# Patient Record
Sex: MALE | Race: WHITE | NOT HISPANIC OR LATINO | URBAN - METROPOLITAN AREA
[De-identification: names, ages, dates, MRNs, and addresses within clinical notes are randomized per-mention and may not be internally consistent; named-entity substitution may affect disease eponyms.]

---

## 2023-08-02 ENCOUNTER — INPATIENT HOSPITAL (INPATIENT)
Dept: URBAN - METROPOLITAN AREA HOSPITAL 124 | Facility: HOSPITAL | Age: 68
End: 2023-08-02

## 2023-08-02 DIAGNOSIS — K29.80 DUODENITIS WITHOUT BLEEDING: ICD-10-CM

## 2023-08-02 DIAGNOSIS — R10.84 GENERALIZED ABDOMINAL PAIN: ICD-10-CM

## 2023-08-02 DIAGNOSIS — K52.9 NONINFECTIVE GASTROENTERITIS AND COLITIS, UNSPECIFIED: ICD-10-CM

## 2023-08-02 DIAGNOSIS — R93.5 ABNORMAL FINDINGS ON DIAGNOSTIC IMAGING OF OTHER ABDOMINAL R: ICD-10-CM

## 2023-08-02 DIAGNOSIS — K31.A15 GASTRIC INTESTINAL METAPLASIA WITHOUT DYSPLASIA, INVOLVING M: ICD-10-CM

## 2023-08-02 DIAGNOSIS — K44.9 DIAPHRAGMATIC HERNIA WITHOUT OBSTRUCTION OR GANGRENE: ICD-10-CM

## 2023-08-02 DIAGNOSIS — K31.89 OTHER DISEASES OF STOMACH AND DUODENUM: ICD-10-CM

## 2023-08-02 PROCEDURE — 43229 ESOPHAGOSCOPY LESION ABLATE: CPT | Performed by: INTERNAL MEDICINE

## 2023-08-02 PROCEDURE — 99222 1ST HOSP IP/OBS MODERATE 55: CPT | Mod: 25,SA | Performed by: NURSE PRACTITIONER

## 2023-10-04 ENCOUNTER — OFFICE (INPATIENT)
Dept: URBAN - METROPOLITAN AREA CLINIC 11 | Facility: CLINIC | Age: 68
End: 2023-10-04

## 2023-10-04 VITALS
WEIGHT: 186 LBS | DIASTOLIC BLOOD PRESSURE: 62 MMHG | OXYGEN SATURATION: 99 % | HEART RATE: 68 BPM | HEIGHT: 75 IN | SYSTOLIC BLOOD PRESSURE: 110 MMHG

## 2023-10-04 DIAGNOSIS — K25.9 GASTRIC ULCER, UNSPECIFIED AS ACUTE OR CHRONIC, WITHOUT HEMO: ICD-10-CM

## 2023-10-04 DIAGNOSIS — R14.0 ABDOMINAL DISTENSION (GASEOUS): ICD-10-CM

## 2023-10-04 DIAGNOSIS — K29.80 DUODENITIS WITHOUT BLEEDING: ICD-10-CM

## 2023-10-04 PROCEDURE — 99214 OFFICE O/P EST MOD 30 MIN: CPT | Performed by: NURSE PRACTITIONER

## 2023-10-04 RX ORDER — PANTOPRAZOLE 40 MG/1
80 TABLET, DELAYED RELEASE ORAL
Qty: 60 | Refills: 4 | Status: COMPLETED
End: 2024-06-17

## 2023-10-04 NOTE — SERVICENOTES
overall doing very well on pantoprazole b.i.d..  Very slight bloating occasionally but nothing concerning.   Will discuss with Dr. Colvin about repeating EGD after he has been on PPI for few months to see if his inflammation has improved.  will continue PPI and see back in 3 months or sooner if needed

## 2023-10-04 NOTE — SERVICEHPINOTES
Mr. Mcmullen is a 68 year old male here today for hospital follow up. He was seen by Dr. Colvin at [facility] on 08/02/2023 for abdominal pain and CT findings concerning for enteritis with duodenal diverticula concern for duodenitis. He had EGD on 08/02/2023 that showed ulcers in the antrum and distal stomach body congestion, erythema and ulceration in the duodenum to the proximal jejunum.  Biopsies showed inflammation that could potentially be infectious ischemic related.   He was started on pantoprazole 40 mg BID.  In follow-up today,  he has slight bloating occasionally but nothing concerning.  Overall, he feels significantly better and has no major complaints or concerns. He continues on pantoprazole 40 mg BID.  he has regular bowel movements and denies any change in bowel habits or blood in stool.  He denies any nausea, vomiting, heartburn, reflux, abdominal pain.  He had a colonoscopy within the last year and a half at [facility] and states he had polyps removed.

## 2024-01-04 ENCOUNTER — OFFICE (INPATIENT)
Dept: URBAN - METROPOLITAN AREA CLINIC 11 | Facility: CLINIC | Age: 69
End: 2024-01-04

## 2024-01-04 VITALS
SYSTOLIC BLOOD PRESSURE: 166 MMHG | DIASTOLIC BLOOD PRESSURE: 81 MMHG | OXYGEN SATURATION: 98 % | HEIGHT: 75 IN | WEIGHT: 197 LBS | HEART RATE: 76 BPM

## 2024-01-04 DIAGNOSIS — K29.80 DUODENITIS WITHOUT BLEEDING: ICD-10-CM

## 2024-01-04 DIAGNOSIS — R14.0 ABDOMINAL DISTENSION (GASEOUS): ICD-10-CM

## 2024-01-04 DIAGNOSIS — K25.9 GASTRIC ULCER, UNSPECIFIED AS ACUTE OR CHRONIC, WITHOUT HEMO: ICD-10-CM

## 2024-01-04 PROCEDURE — 99214 OFFICE O/P EST MOD 30 MIN: CPT | Performed by: NURSE PRACTITIONER

## 2024-01-04 RX ORDER — PANTOPRAZOLE 40 MG/1
80 TABLET, DELAYED RELEASE ORAL
Qty: 60 | Refills: 4 | Status: COMPLETED
End: 2024-06-17

## 2024-01-04 NOTE — SERVICEHPINOTES
Mr. Mcmullen is a 68 year old male here today for hospital follow up. He was seen by Dr. Colvin at [facility] on 08/02/2023 for abdominal pain and CT findings concerning for enteritis with duodenal diverticula concern for duodenitis. He had EGD on 08/02/2023 that showed ulcers in the antrum and distal stomach body congestion, erythema and ulceration in the duodenum to the proximal jejunum.  Biopsies showed inflammation that could potentially be infectious ischemic related.   He was started on pantoprazole 40 mg BID.  In follow-up today,  he has slight bloating occasionally but nothing concerning.  Overall, he feels well and has no major GI complaints or concerns. He continues on pantoprazole 40 mg BID.  He has regular bowel movements and denies any change in bowel habits or blood in stool.  He denies any nausea, vomiting, heartburn, reflux, abdominal pain.

## 2024-01-04 NOTE — SERVICENOTES
he is doing well on PPI BID. will continue and repeat EGD. will see back in 3 months or sooner if needed

## 2024-02-08 ENCOUNTER — AMBULATORY SURGICAL CENTER (INPATIENT)
Dept: URBAN - METROPOLITAN AREA SURGERY 3 | Facility: SURGERY | Age: 69
End: 2024-02-08
Payer: COMMERCIAL

## 2024-02-08 VITALS
HEART RATE: 55 BPM | HEART RATE: 60 BPM | HEART RATE: 61 BPM | OXYGEN SATURATION: 94 % | HEIGHT: 75 IN | RESPIRATION RATE: 16 BRPM | DIASTOLIC BLOOD PRESSURE: 58 MMHG | OXYGEN SATURATION: 95 % | OXYGEN SATURATION: 95 % | OXYGEN SATURATION: 99 % | WEIGHT: 186 LBS | RESPIRATION RATE: 16 BRPM | DIASTOLIC BLOOD PRESSURE: 56 MMHG | OXYGEN SATURATION: 94 % | HEIGHT: 75 IN | RESPIRATION RATE: 14 BRPM | RESPIRATION RATE: 15 BRPM | SYSTOLIC BLOOD PRESSURE: 106 MMHG | WEIGHT: 186 LBS | SYSTOLIC BLOOD PRESSURE: 106 MMHG | SYSTOLIC BLOOD PRESSURE: 98 MMHG | SYSTOLIC BLOOD PRESSURE: 106 MMHG | TEMPERATURE: 98.3 F | SYSTOLIC BLOOD PRESSURE: 147 MMHG | OXYGEN SATURATION: 96 % | HEART RATE: 55 BPM | DIASTOLIC BLOOD PRESSURE: 70 MMHG | TEMPERATURE: 98.2 F | RESPIRATION RATE: 18 BRPM | SYSTOLIC BLOOD PRESSURE: 147 MMHG | HEIGHT: 75 IN | HEART RATE: 79 BPM | HEART RATE: 60 BPM | OXYGEN SATURATION: 94 % | DIASTOLIC BLOOD PRESSURE: 75 MMHG | HEART RATE: 62 BPM | HEART RATE: 79 BPM | OXYGEN SATURATION: 95 % | DIASTOLIC BLOOD PRESSURE: 58 MMHG | RESPIRATION RATE: 14 BRPM | OXYGEN SATURATION: 96 % | HEART RATE: 61 BPM | RESPIRATION RATE: 16 BRPM | DIASTOLIC BLOOD PRESSURE: 75 MMHG | HEART RATE: 61 BPM | HEART RATE: 62 BPM | OXYGEN SATURATION: 99 % | WEIGHT: 186 LBS | TEMPERATURE: 98.3 F | HEART RATE: 62 BPM | HEART RATE: 60 BPM | SYSTOLIC BLOOD PRESSURE: 113 MMHG | DIASTOLIC BLOOD PRESSURE: 70 MMHG | RESPIRATION RATE: 18 BRPM | RESPIRATION RATE: 18 BRPM | DIASTOLIC BLOOD PRESSURE: 56 MMHG | SYSTOLIC BLOOD PRESSURE: 113 MMHG | DIASTOLIC BLOOD PRESSURE: 70 MMHG | OXYGEN SATURATION: 96 % | SYSTOLIC BLOOD PRESSURE: 113 MMHG | HEART RATE: 79 BPM | OXYGEN SATURATION: 99 % | RESPIRATION RATE: 14 BRPM | SYSTOLIC BLOOD PRESSURE: 147 MMHG | DIASTOLIC BLOOD PRESSURE: 75 MMHG | SYSTOLIC BLOOD PRESSURE: 98 MMHG | DIASTOLIC BLOOD PRESSURE: 56 MMHG | DIASTOLIC BLOOD PRESSURE: 58 MMHG | TEMPERATURE: 98.2 F | HEART RATE: 55 BPM | TEMPERATURE: 98.3 F | RESPIRATION RATE: 15 BRPM | RESPIRATION RATE: 15 BRPM | SYSTOLIC BLOOD PRESSURE: 98 MMHG | TEMPERATURE: 98.2 F

## 2024-02-08 DIAGNOSIS — K29.80 DUODENITIS WITHOUT BLEEDING: ICD-10-CM

## 2024-02-08 DIAGNOSIS — K25.9 GASTRIC ULCER, UNSPECIFIED AS ACUTE OR CHRONIC, WITHOUT HEMO: ICD-10-CM

## 2024-02-08 DIAGNOSIS — Z87.11 PERSONAL HISTORY OF PEPTIC ULCER DISEASE: ICD-10-CM

## 2024-02-08 PROCEDURE — 43235 EGD DIAGNOSTIC BRUSH WASH: CPT | Performed by: INTERNAL MEDICINE

## 2024-06-17 ENCOUNTER — OFFICE (INPATIENT)
Dept: URBAN - METROPOLITAN AREA CLINIC 11 | Facility: CLINIC | Age: 69
End: 2024-06-17
Payer: COMMERCIAL

## 2024-06-17 VITALS
HEIGHT: 75 IN | HEART RATE: 54 BPM | WEIGHT: 189.8 LBS | SYSTOLIC BLOOD PRESSURE: 128 MMHG | OXYGEN SATURATION: 99 % | DIASTOLIC BLOOD PRESSURE: 83 MMHG | SYSTOLIC BLOOD PRESSURE: 149 MMHG | DIASTOLIC BLOOD PRESSURE: 81 MMHG

## 2024-06-17 DIAGNOSIS — K29.80 DUODENITIS WITHOUT BLEEDING: ICD-10-CM

## 2024-06-17 DIAGNOSIS — R14.0 ABDOMINAL DISTENSION (GASEOUS): ICD-10-CM

## 2024-06-17 DIAGNOSIS — K25.9 GASTRIC ULCER, UNSPECIFIED AS ACUTE OR CHRONIC, WITHOUT HEMO: ICD-10-CM

## 2024-06-17 PROCEDURE — 99213 OFFICE O/P EST LOW 20 MIN: CPT | Performed by: NURSE PRACTITIONER

## 2024-06-17 NOTE — SERVICENOTES
he is doing well and has no major concerns.  He has some bloating likely from something he has eating a few times a month but nothing concerning.   He thinks he had a colonoscopy by Dr. Flores a while back.  We will get records.  will get him scheduled for colonoscopy if time or we will place recall system.

## 2024-06-17 NOTE — SERVICEHPINOTES
Mr. Mcmullen is a 68 year old male here today for hospital follow up. He was seen by Dr. Colvin at [facility], [place], operated by [facility] on 08/02/2023 for abdominal pain and CT findings concerning for enteritis with duodenal diverticula concern for duodenitis. He had EGD on 08/02/2023 that showed ulcers in the antrum and distal stomach body congestion, erythema and ulceration in the duodenum to the proximal jejunum.  Biopsies showed inflammation that could potentially be infectious ischemic related.   He was started on pantoprazole 40 mg BID. 
br
br  In follow-up on 1/4/24,  he has slight bloating occasionally but nothing concerning.  Overall, he feels well and has no major GI complaints or concerns. He continues on pantoprazole 40 mg BID.  He has regular bowel movements and denies any change in bowel habits or blood in stool.  He denies any nausea, vomiting, heartburn, reflux, abdominal pain.   He had a normal follow-up EGD on 02/08/2024.br
br In follow up on 6/14/24,   He is doing well.   He has no major complaints or concerns other than some occasional bloating a few times a month likely from something he is eating.   He has been off pantoprazole for a few months and denies any nausea, vomiting, heartburn, reflux.